# Patient Record
Sex: FEMALE | Race: WHITE | URBAN - NONMETROPOLITAN AREA
[De-identification: names, ages, dates, MRNs, and addresses within clinical notes are randomized per-mention and may not be internally consistent; named-entity substitution may affect disease eponyms.]

---

## 2021-06-02 ENCOUNTER — OUTSIDE SERVICES (OUTPATIENT)
Dept: FAMILY MEDICINE CLINIC | Age: 86
End: 2021-06-02
Payer: MEDICARE

## 2021-06-02 DIAGNOSIS — E03.9 HYPOTHYROIDISM, UNSPECIFIED TYPE: ICD-10-CM

## 2021-06-02 DIAGNOSIS — S62.101S CLOSED FRACTURE OF RIGHT WRIST, SEQUELA: ICD-10-CM

## 2021-06-02 DIAGNOSIS — K21.00 GASTROESOPHAGEAL REFLUX DISEASE WITH ESOPHAGITIS WITHOUT HEMORRHAGE: ICD-10-CM

## 2021-06-02 DIAGNOSIS — S62.102S CLOSED FRACTURE OF LEFT WRIST, SEQUELA: Primary | ICD-10-CM

## 2021-06-02 DIAGNOSIS — J45.909 ASTHMA, UNSPECIFIED ASTHMA SEVERITY, UNSPECIFIED WHETHER COMPLICATED, UNSPECIFIED WHETHER PERSISTENT: ICD-10-CM

## 2021-06-02 PROCEDURE — 99310 SBSQ NF CARE HIGH MDM 45: CPT | Performed by: NURSE PRACTITIONER

## 2021-06-02 NOTE — PROGRESS NOTES
6/2/2021    Heidi Gell  1935    This resident is being seen today for discharge evaluation visit. She is a resident who has long-term medical conditions including bronchial asthma, hypothyroidism, gastroesophageal reflux disease with is without esophagitis. She is a 81 y.o. female resident who is being seen today for discharge post acute traumatic left wrist fracture and recent traumatic right wrist fracture. It is noted that patient had the initial wrist fracture on the right after having her dog jumped up and she fell that was in April and then subsequently May 14 she again had a problem with the dog and fractured the left wrist patient was followed by Dr. Jefferson Ellis in orthopedic surgery and did have cast removed on today's date and she did have splints placed on both wrists. She has been undergoing physical and occupational therapy here at [facility]. She is denying any pain currently to her wrists. She has not been using the hydrocodone at all. He does have Aleve ordered as needed. States it is mostly just awkward with her wrists in the splints. .  Currently at this time she denies any complaints of chest pain or palpitations. Denies any headaches, any sore throat, coughing, or shortness of breath. No nausea, vomiting, constipation or diarrhea. No pain in lower extremities. No fever, or chills. No recent falls or syncopal events. Objective     Vital Signs: Stable    Physical examination:Skin is essentially warm and dry. HEENT unremarkable. Neck is supple. Heart regular rate and rhythm. No rubs, gallops or murmurs noted. Lungs are clear to auscultation. No evidence of rhonchi, rales, or wheezing. Abdomen is soft, supple and non-tender. Bowel sounds are noted x4 quadrants. No rigidity, guarding or rebound tenderness. Negative Garcia's, negative McBurney's, negative Sahil's. Extremities; no true pitting edema. Pulses are adequate. No clubbing  or no cyanosis noted.   Bilateral wrists are splinted. She is able to wiggle her fingers and has good sensation distally. There is mild swelling to the hands. Capillary refill is less than 2 seconds. Neurologically she  is alert and oriented x3. No evidence of paralysis or paresthesias noted. Diagnoses and all orders for this visit:    Closed fracture of left wrist, sequela    Closed fracture of right wrist, sequela    Asthma, unspecified asthma severity, unspecified whether complicated, unspecified whether persistent    Hypothyroidism, unspecified type    Gastroesophageal reflux disease with esophagitis without hemorrhage      Patient will be discharged on 6/3/2021 with visiting PT OT and nurses aide. She will follow-up in 1 to 2 weeks with Dr. Tamica Posadas  her PCP. She will follow-up as scheduled with orthopedics. Medications will include Aleve 220 mg every 12 hours as needed for pain, levothyroxine 50 mcg daily, terbutaline 2.5 mg twice daily, montelukast 10 mg daily, was supposed all 200 mcg daily, Klor-Con 10 mEq ER every other day, Reglan 5 mg twice daily, and Fluticasone 500 mcg with Salmetrol 50 mcg one inhalation Twice Daily. Patient will reportedly not have her dogs at home when she goes home. Lenora Benjamin, APRN - CNP        *Note was creating using voice recognition software.   The document was reviewed however grammatical errors may exist.